# Patient Record
Sex: FEMALE | Race: WHITE | NOT HISPANIC OR LATINO | Employment: FULL TIME | URBAN - METROPOLITAN AREA
[De-identification: names, ages, dates, MRNs, and addresses within clinical notes are randomized per-mention and may not be internally consistent; named-entity substitution may affect disease eponyms.]

---

## 2020-10-29 ENCOUNTER — LAB (OUTPATIENT)
Dept: LAB | Facility: HOSPITAL | Age: 40
End: 2020-10-29

## 2020-10-29 ENCOUNTER — OFFICE VISIT (OUTPATIENT)
Dept: ENDOCRINOLOGY | Facility: CLINIC | Age: 40
End: 2020-10-29

## 2020-10-29 VITALS
SYSTOLIC BLOOD PRESSURE: 100 MMHG | HEIGHT: 62 IN | BODY MASS INDEX: 32.76 KG/M2 | HEART RATE: 60 BPM | DIASTOLIC BLOOD PRESSURE: 78 MMHG | WEIGHT: 178 LBS

## 2020-10-29 DIAGNOSIS — E05.00 GRAVES' DISEASE: Primary | ICD-10-CM

## 2020-10-29 DIAGNOSIS — E05.00 GRAVES' DISEASE: ICD-10-CM

## 2020-10-29 LAB
ALBUMIN SERPL-MCNC: 4.6 G/DL (ref 3.5–5.2)
ALBUMIN/GLOB SERPL: 1.5 G/DL
ALP SERPL-CCNC: 113 U/L (ref 39–117)
ALT SERPL W P-5'-P-CCNC: 15 U/L (ref 1–33)
ANION GAP SERPL CALCULATED.3IONS-SCNC: 9.4 MMOL/L (ref 5–15)
AST SERPL-CCNC: 15 U/L (ref 1–32)
BILIRUB SERPL-MCNC: 0.3 MG/DL (ref 0–1.2)
BUN SERPL-MCNC: 9 MG/DL (ref 6–20)
BUN/CREAT SERPL: 14.3 (ref 7–25)
CALCIUM SPEC-SCNC: 9.2 MG/DL (ref 8.6–10.5)
CHLORIDE SERPL-SCNC: 103 MMOL/L (ref 98–107)
CO2 SERPL-SCNC: 26.6 MMOL/L (ref 22–29)
CREAT SERPL-MCNC: 0.63 MG/DL (ref 0.57–1)
GFR SERPL CREATININE-BSD FRML MDRD: 104 ML/MIN/1.73
GLOBULIN UR ELPH-MCNC: 3 GM/DL
GLUCOSE SERPL-MCNC: 105 MG/DL (ref 65–99)
POTASSIUM SERPL-SCNC: 4 MMOL/L (ref 3.5–5.2)
PROT SERPL-MCNC: 7.6 G/DL (ref 6–8.5)
SODIUM SERPL-SCNC: 139 MMOL/L (ref 136–145)
T4 FREE SERPL-MCNC: 0.99 NG/DL (ref 0.93–1.7)
TSH SERPL DL<=0.05 MIU/L-ACNC: 1.79 UIU/ML (ref 0.27–4.2)

## 2020-10-29 PROCEDURE — 99213 OFFICE O/P EST LOW 20 MIN: CPT | Performed by: INTERNAL MEDICINE

## 2020-10-29 PROCEDURE — 80053 COMPREHEN METABOLIC PANEL: CPT | Performed by: INTERNAL MEDICINE

## 2020-10-29 PROCEDURE — 84443 ASSAY THYROID STIM HORMONE: CPT | Performed by: INTERNAL MEDICINE

## 2020-10-29 PROCEDURE — 84439 ASSAY OF FREE THYROXINE: CPT | Performed by: INTERNAL MEDICINE

## 2020-10-29 RX ORDER — METHIMAZOLE 5 MG/1
5 TABLET ORAL DAILY
COMMUNITY
Start: 2020-10-23 | End: 2021-05-03 | Stop reason: SDUPTHER

## 2020-10-29 RX ORDER — LOVASTATIN 10 MG/1
TABLET ORAL DAILY
COMMUNITY
Start: 2020-10-25 | End: 2022-02-01

## 2020-10-29 NOTE — PROGRESS NOTES
"     Office Note      Date: 10/29/2020  Patient Name: Antonieta Waddell  MRN: [id number]  : 1980    Chief Complaint   Patient presents with   • Hyperthyroidism       History of Present Illness:   Antonieta Waddell is a 40 y.o. female who presents for Hyperthyroidism  This patient has Graves disease which recurred after a 10 year remission and  Back in the summer I put her on methimazole. She is on 10 mg per day.  Duration: 1 year  Modifying factors- medication  Severity- moderate  Symptoms- feels better now.    No temperature intolerance. No heart palpitations. No hair or skin changes.  Less hair loss. Sleeping  Better and less fatigue.    Subjective          Review of Systems:   Review of Systems   Constitutional: Negative.    Eyes: Negative.    Cardiovascular: Negative.    Endocrine: Negative.    Neurological: Negative.        The following portions of the patient's history were reviewed and updated as appropriate: allergies, current medications, past family history, past medical history, past social history, past surgical history and problem list.    Objective     Visit Vitals  /78 (BP Location: Right arm, Patient Position: Sitting, Cuff Size: Adult)   Pulse 60   Ht 157.5 cm (62\")   Wt 80.7 kg (178 lb)   BMI 32.56 kg/m²       Labs:    CBC w/DIFF  No results found for: WBC, RBC, HGB, HCT, MCV, MCH, MCHC, RDW, RDWSD, MPV, PLT, NEUTRORELPCT, LYMPHORELPCT, MONORELPCT, EOSRELPCT, BASORELPCT, AUTOIGPER, NEUTROABS, LYMPHSABS, MONOSABS, EOSABS, BASOSABS, AUTOIGNUM, NRBC    T4  No results found for: FREET4    TSH  No results found for: TSHBASE     Physical Exam:  Physical Exam  Constitutional:       Appearance: Normal appearance. She is obese.   Neck:      Thyroid: No thyroid mass, thyromegaly or thyroid tenderness.   Cardiovascular:      Rate and Rhythm: Normal rate and regular rhythm.   Neurological:      General: No focal deficit present.      Mental Status: She is alert. Mental status is at baseline.   Psychiatric:  "        Mood and Affect: Mood normal.         Thought Content: Thought content normal.         Judgment: Judgment normal.         Assessment / Plan      Assessment & Plan:  Problem List Items Addressed This Visit        Endocrine    Graves' disease - Primary    Current Assessment & Plan     Clinically improved. Needs labs to verify. I anticipate we will be able to reduce her dose          Relevant Medications    methIMAzole (TAPAZOLE) 10 MG tablet           Caleb Zimmer MD   10/29/2020

## 2021-01-29 ENCOUNTER — OFFICE VISIT (OUTPATIENT)
Dept: ENDOCRINOLOGY | Facility: CLINIC | Age: 41
End: 2021-01-29

## 2021-01-29 ENCOUNTER — LAB (OUTPATIENT)
Dept: LAB | Facility: HOSPITAL | Age: 41
End: 2021-01-29

## 2021-01-29 VITALS
HEIGHT: 62 IN | SYSTOLIC BLOOD PRESSURE: 120 MMHG | DIASTOLIC BLOOD PRESSURE: 80 MMHG | BODY MASS INDEX: 34.23 KG/M2 | WEIGHT: 186 LBS | TEMPERATURE: 97.7 F

## 2021-01-29 DIAGNOSIS — E05.00 GRAVES' DISEASE: Primary | ICD-10-CM

## 2021-01-29 DIAGNOSIS — E05.00 GRAVES' DISEASE: ICD-10-CM

## 2021-01-29 PROCEDURE — 84443 ASSAY THYROID STIM HORMONE: CPT

## 2021-01-29 PROCEDURE — 84439 ASSAY OF FREE THYROXINE: CPT

## 2021-01-29 PROCEDURE — 99213 OFFICE O/P EST LOW 20 MIN: CPT | Performed by: INTERNAL MEDICINE

## 2021-01-30 LAB
T4 FREE SERPL-MCNC: 1.25 NG/DL (ref 0.93–1.7)
TSH SERPL DL<=0.05 MIU/L-ACNC: 2.43 UIU/ML (ref 0.27–4.2)

## 2021-05-03 RX ORDER — METHIMAZOLE 10 MG/1
10 TABLET ORAL DAILY
Qty: 90 TABLET | Refills: 2 | Status: SHIPPED | OUTPATIENT
Start: 2021-05-03 | End: 2021-07-30

## 2021-05-03 RX ORDER — METHIMAZOLE 10 MG/1
10 TABLET ORAL DAILY
COMMUNITY
Start: 2021-01-25 | End: 2021-05-03 | Stop reason: SDUPTHER

## 2021-07-30 ENCOUNTER — LAB (OUTPATIENT)
Dept: LAB | Facility: HOSPITAL | Age: 41
End: 2021-07-30

## 2021-07-30 ENCOUNTER — OFFICE VISIT (OUTPATIENT)
Dept: ENDOCRINOLOGY | Facility: CLINIC | Age: 41
End: 2021-07-30

## 2021-07-30 VITALS
HEIGHT: 62 IN | OXYGEN SATURATION: 98 % | HEART RATE: 72 BPM | SYSTOLIC BLOOD PRESSURE: 110 MMHG | BODY MASS INDEX: 32.94 KG/M2 | DIASTOLIC BLOOD PRESSURE: 80 MMHG | WEIGHT: 179 LBS

## 2021-07-30 DIAGNOSIS — E05.00 GRAVES' DISEASE: Primary | ICD-10-CM

## 2021-07-30 DIAGNOSIS — E05.00 GRAVES' DISEASE: ICD-10-CM

## 2021-07-30 PROCEDURE — 84443 ASSAY THYROID STIM HORMONE: CPT

## 2021-07-30 PROCEDURE — 99213 OFFICE O/P EST LOW 20 MIN: CPT | Performed by: INTERNAL MEDICINE

## 2021-07-30 PROCEDURE — 84439 ASSAY OF FREE THYROXINE: CPT

## 2021-07-30 RX ORDER — METHIMAZOLE 10 MG/1
5 TABLET ORAL DAILY
Qty: 90 TABLET | Refills: 2 | Status: SHIPPED | OUTPATIENT
Start: 2021-07-30 | End: 2022-02-03

## 2021-07-30 NOTE — PROGRESS NOTES
"     Office Note      Date: 2021  Patient Name: Antonieta Waddell  MRN: [id number]  : 1980    Chief Complaint   Patient presents with   • Graves' Disease       History of Present Illness:   Antonieta Waddell is a 41 y.o. female who presents for Graves' Disease  SHE IS ON MMI, 5 MG PER DAY.  SHE HAS BEEN FEELING WELL. SHE HAS LOST 7 POUNDS.    SHE HAD LABS WITH HER PCP AND HER A1C WAS 5.7  SHE HAS NO QUESTIONS OR SYMPTOMS    Subjective          Review of Systems:   Review of Systems   Constitutional: Negative for fatigue and unexpected weight change.   Eyes: Negative for redness and visual disturbance.       The following portions of the patient's history were reviewed and updated as appropriate: allergies, current medications, past family history, past medical history, past social history, past surgical history and problem list.    Objective     Visit Vitals  /80   Pulse 72   Ht 157.5 cm (62\")   Wt 81.2 kg (179 lb)   SpO2 98%   BMI 32.74 kg/m²       T4  Free T4   Date Value Ref Range Status   2021 1.25 0.93 - 1.70 ng/dL Final       TSH  No results found for: TSHBASE     Physical Exam:  Physical Exam  Vitals reviewed.   Constitutional:       Appearance: Normal appearance.   Eyes:      Extraocular Movements: Extraocular movements intact.   Neck:      Comments: SLIGHT GOITER  Musculoskeletal:         General: Normal range of motion.      Comments: NO TREMOR   Lymphadenopathy:      Cervical: No cervical adenopathy.   Neurological:      Mental Status: She is alert.         Assessment / Plan      Assessment & Plan:  Problem List Items Addressed This Visit        Other    Graves' disease - Primary    Current Assessment & Plan     CLINICALLY EUTHYROID    WILL CHECK LEVELS AND ADJUST ACCORDINGLY          Relevant Medications    methIMAzole (TAPAZOLE) 10 MG tablet    Other Relevant Orders    T4, Free    TSH           Caleb Zimmer MD   2021  "

## 2021-07-31 LAB
T4 FREE SERPL-MCNC: 1.24 NG/DL (ref 0.93–1.7)
TSH SERPL DL<=0.05 MIU/L-ACNC: 3.29 UIU/ML (ref 0.27–4.2)

## 2021-11-18 ENCOUNTER — TELEPHONE (OUTPATIENT)
Dept: SURGERY | Facility: CLINIC | Age: 41
End: 2021-11-18

## 2021-11-18 NOTE — TELEPHONE ENCOUNTER
Patient moving to [facility]  She will follow up with a [facility] and or breast surgeon for screening mammos, mris and clinical breast exams.     Patient will let us know who she sees once she gets settled.

## 2022-02-01 ENCOUNTER — LAB (OUTPATIENT)
Dept: LAB | Facility: HOSPITAL | Age: 42
End: 2022-02-01

## 2022-02-01 ENCOUNTER — OFFICE VISIT (OUTPATIENT)
Dept: ENDOCRINOLOGY | Facility: CLINIC | Age: 42
End: 2022-02-01

## 2022-02-01 VITALS
HEART RATE: 81 BPM | OXYGEN SATURATION: 98 % | BODY MASS INDEX: 33.31 KG/M2 | HEIGHT: 62 IN | WEIGHT: 181 LBS | DIASTOLIC BLOOD PRESSURE: 68 MMHG | SYSTOLIC BLOOD PRESSURE: 112 MMHG

## 2022-02-01 DIAGNOSIS — E05.00 GRAVES' DISEASE: ICD-10-CM

## 2022-02-01 DIAGNOSIS — E05.00 GRAVES' DISEASE: Primary | ICD-10-CM

## 2022-02-01 PROCEDURE — 84439 ASSAY OF FREE THYROXINE: CPT

## 2022-02-01 PROCEDURE — 99213 OFFICE O/P EST LOW 20 MIN: CPT | Performed by: INTERNAL MEDICINE

## 2022-02-01 PROCEDURE — 84443 ASSAY THYROID STIM HORMONE: CPT

## 2022-02-01 RX ORDER — LOVASTATIN 20 MG/1
TABLET ORAL
COMMUNITY
Start: 2021-12-28

## 2022-02-01 NOTE — PROGRESS NOTES
"     Office Note      Date: 2022  Patient Name: Antonieta Waddell  MRN: [id number]  : 1980    Chief Complaint   Patient presents with   • Graves' Disease       History of Present Illness:   Antonieta Waddell is a 41 y.o. female who presents for Graves' Disease  which recurred after a 10 year remission. She has been back on methimazole now for 18 months   She is currently on 5 mg per day.  -------------------------  There has been no change to her medical history  She has not questions or problems     Subjective          Review of Systems:   Review of Systems   Constitutional: Positive for unexpected weight change. Negative for fatigue.   HENT: Negative for trouble swallowing and voice change.    Eyes: Negative for visual disturbance.   Cardiovascular: Negative for palpitations.   Neurological: Negative for tremors.   Psychiatric/Behavioral: Negative for sleep disturbance.       The following portions of the patient's history were reviewed and updated as appropriate: allergies, current medications, past family history, past medical history, past social history, past surgical history and problem list.    Objective     Visit Vitals  /68   Pulse 81   Ht 157.5 cm (62\")   Wt 82.1 kg (181 lb)   SpO2 98%   BMI 33.11 kg/m²       Labs:    CBC w/DIFF  No results found for: WBC, RBC, HGB, HCT, MCV, MCH, MCHC, RDW, RDWSD, MPV, PLT, NEUTRORELPCT, LYMPHORELPCT, MONORELPCT, EOSRELPCT, BASORELPCT, AUTOIGPER, NEUTROABS, LYMPHSABS, MONOSABS, EOSABS, BASOSABS, AUTOIGNUM, NRBC    T4  Free T4   Date Value Ref Range Status   2021 1.24 0.93 - 1.70 ng/dL Final       TSH  No results found for: TSHBASE     Physical Exam:  Physical Exam  Vitals reviewed.   Constitutional:       Appearance: Normal appearance.   Eyes:      Extraocular Movements: Extraocular movements intact.   Neck:      Comments: No goiter  Lymphadenopathy:      Cervical: No cervical adenopathy.   Neurological:      Mental Status: She is alert.   Psychiatric:    "      Mood and Affect: Mood normal.         Thought Content: Thought content normal.         Judgment: Judgment normal.         Assessment / Plan      Assessment & Plan:  Problem List Items Addressed This Visit        Other    Graves' disease - Primary    Current Assessment & Plan     Clinically euthyroid. Thyroid levels ordered. Medication to be adjusted accordingly. If normal, my plan is to stop the methimazole          Relevant Medications    methIMAzole (TAPAZOLE) 10 MG tablet    Other Relevant Orders    T4, Free    TSH           Caleb Zimmer MD   02/01/2022

## 2022-02-01 NOTE — ASSESSMENT & PLAN NOTE
Clinically euthyroid. Thyroid levels ordered. Medication to be adjusted accordingly. If normal, my plan is to stop the methimazole

## 2022-02-02 LAB
T4 FREE SERPL-MCNC: 1.31 NG/DL (ref 0.93–1.7)
TSH SERPL DL<=0.05 MIU/L-ACNC: 2.89 UIU/ML (ref 0.27–4.2)

## 2022-02-07 RX ORDER — METHIMAZOLE 10 MG/1
TABLET ORAL
Qty: 90 TABLET | Refills: 2 | OUTPATIENT
Start: 2022-02-07

## 2022-04-27 NOTE — PROGRESS NOTES
"     Office Note      Date: 2021  Patient Name: Antonieta Waddell  MRN: [id number]  : 1980    Chief Complaint   Patient presents with   • Follow-up   • Graves' Disease       History of Present Illness:   Antonieta Waddell is a 40 y.o. female who presents for Follow-up and Graves' Disease  she is on 5 mg of mmi per day. (reduced the dose from 10 mg last time ).  She had covid since last time  She notes her energy level is off.  She is up 8 pounds.  No temperature intolerance.   No changes to neck or eyes     Subjective          Review of Systems:   Review of Systems   Constitutional: Positive for fatigue.   HENT: Negative.    Eyes: Negative.    Respiratory: Negative.    All other systems reviewed and are negative.      The following portions of the patient's history were reviewed and updated as appropriate: allergies, current medications, past family history, past medical history, past social history, past surgical history and problem list.    Objective     Visit Vitals  /80   Temp 97.7 °F (36.5 °C) (Infrared)   Ht 157.5 cm (62\")   Wt 84.4 kg (186 lb)   BMI 34.02 kg/m²       Labs:    CBC w/DIFF  No results found for: WBC, RBC, HGB, HCT, MCV, MCH, MCHC, RDW, RDWSD, MPV, PLT, NEUTRORELPCT, LYMPHORELPCT, MONORELPCT, EOSRELPCT, BASORELPCT, AUTOIGPER, NEUTROABS, LYMPHSABS, MONOSABS, EOSABS, BASOSABS, AUTOIGNUM, NRBC    T4  Free T4   Date Value Ref Range Status   10/29/2020 0.99 0.93 - 1.70 ng/dL Final       TSH  No results found for: TSHBASE     Physical Exam:  Physical Exam  Vitals signs reviewed.   Constitutional:       Appearance: Normal appearance. She is normal weight.   HENT:      Head: Normocephalic and atraumatic.   Neck:      Comments: Thyroid is not palpably enlarged   Neurological:      Mental Status: She is alert.   Psychiatric:         Mood and Affect: Mood normal.         Thought Content: Thought content normal.         Judgment: Judgment normal.         Assessment / Plan      Assessment & " Plan:  Problem List Items Addressed This Visit        Endocrine and Metabolic    Graves' disease - Primary    Current Assessment & Plan     Clinically euthyroid  Labs ordered  meds to be refilled          Relevant Medications    methIMAzole (TAPAZOLE) 5 MG tablet    Other Relevant Orders    T4, Free    TSH           Caleb Zimmer MD   01/29/2021   No